# Patient Record
Sex: MALE | Race: WHITE | NOT HISPANIC OR LATINO | URBAN - METROPOLITAN AREA
[De-identification: names, ages, dates, MRNs, and addresses within clinical notes are randomized per-mention and may not be internally consistent; named-entity substitution may affect disease eponyms.]

---

## 2017-05-24 ENCOUNTER — HOSPITAL ENCOUNTER (OUTPATIENT)
Dept: LAB | Facility: MEDICAL CENTER | Age: 77
End: 2017-05-24
Attending: PHYSICIAN ASSISTANT
Payer: MEDICARE

## 2017-05-24 LAB
ALBUMIN SERPL BCP-MCNC: 3.8 G/DL (ref 3.2–4.9)
ALBUMIN/GLOB SERPL: 1.1 G/DL
ALP SERPL-CCNC: 151 U/L (ref 30–99)
ALT SERPL-CCNC: 14 U/L (ref 2–50)
ANION GAP SERPL CALC-SCNC: 7 MMOL/L (ref 0–11.9)
AST SERPL-CCNC: 16 U/L (ref 12–45)
BASOPHILS # BLD AUTO: 0.6 % (ref 0–1.8)
BASOPHILS # BLD: 0.04 K/UL (ref 0–0.12)
BILIRUB SERPL-MCNC: 0.5 MG/DL (ref 0.1–1.5)
BUN SERPL-MCNC: 20 MG/DL (ref 8–22)
CALCIUM SERPL-MCNC: 9.3 MG/DL (ref 8.5–10.5)
CHLORIDE SERPL-SCNC: 106 MMOL/L (ref 96–112)
CO2 SERPL-SCNC: 29 MMOL/L (ref 20–33)
CREAT SERPL-MCNC: 1.09 MG/DL (ref 0.5–1.4)
EOSINOPHIL # BLD AUTO: 0.13 K/UL (ref 0–0.51)
EOSINOPHIL NFR BLD: 1.8 % (ref 0–6.9)
ERYTHROCYTE [DISTWIDTH] IN BLOOD BY AUTOMATED COUNT: 50.5 FL (ref 35.9–50)
GFR SERPL CREATININE-BSD FRML MDRD: >60 ML/MIN/1.73 M 2
GLOBULIN SER CALC-MCNC: 3.6 G/DL (ref 1.9–3.5)
GLUCOSE SERPL-MCNC: 80 MG/DL (ref 65–99)
HCT VFR BLD AUTO: 42.8 % (ref 42–52)
HGB BLD-MCNC: 13.8 G/DL (ref 14–18)
IMM GRANULOCYTES # BLD AUTO: 0.01 K/UL (ref 0–0.11)
IMM GRANULOCYTES NFR BLD AUTO: 0.1 % (ref 0–0.9)
LYMPHOCYTES # BLD AUTO: 1.44 K/UL (ref 1–4.8)
LYMPHOCYTES NFR BLD: 20.2 % (ref 22–41)
MCH RBC QN AUTO: 31.7 PG (ref 27–33)
MCHC RBC AUTO-ENTMCNC: 32.2 G/DL (ref 33.7–35.3)
MCV RBC AUTO: 98.2 FL (ref 81.4–97.8)
MONOCYTES # BLD AUTO: 0.58 K/UL (ref 0–0.85)
MONOCYTES NFR BLD AUTO: 8.1 % (ref 0–13.4)
NEUTROPHILS # BLD AUTO: 4.93 K/UL (ref 1.82–7.42)
NEUTROPHILS NFR BLD: 69.2 % (ref 44–72)
NRBC # BLD AUTO: 0 K/UL
NRBC BLD AUTO-RTO: 0 /100 WBC
PLATELET # BLD AUTO: 310 K/UL (ref 164–446)
PMV BLD AUTO: 10.6 FL (ref 9–12.9)
POTASSIUM SERPL-SCNC: 4.1 MMOL/L (ref 3.6–5.5)
PROT SERPL-MCNC: 7.4 G/DL (ref 6–8.2)
RBC # BLD AUTO: 4.36 M/UL (ref 4.7–6.1)
SODIUM SERPL-SCNC: 142 MMOL/L (ref 135–145)
WBC # BLD AUTO: 7.1 K/UL (ref 4.8–10.8)

## 2017-05-24 PROCEDURE — 80053 COMPREHEN METABOLIC PANEL: CPT

## 2017-05-24 PROCEDURE — 36415 COLL VENOUS BLD VENIPUNCTURE: CPT

## 2017-05-24 PROCEDURE — 80061 LIPID PANEL: CPT

## 2017-05-24 PROCEDURE — 85025 COMPLETE CBC W/AUTO DIFF WBC: CPT

## 2017-05-25 LAB
CHOLEST SERPL-MCNC: NORMAL MG/DL (ref 100–199)
HDLC SERPL-MCNC: NORMAL MG/DL
LDLC SERPL CALC-MCNC: NORMAL MG/DL
TRIGL SERPL-MCNC: NORMAL MG/DL (ref 0–149)

## 2017-06-19 ENCOUNTER — OFFICE VISIT (OUTPATIENT)
Dept: URGENT CARE | Facility: PHYSICIAN GROUP | Age: 77
End: 2017-06-19
Payer: MEDICARE

## 2017-06-19 VITALS
WEIGHT: 143 LBS | OXYGEN SATURATION: 97 % | HEART RATE: 61 BPM | SYSTOLIC BLOOD PRESSURE: 126 MMHG | HEIGHT: 73 IN | BODY MASS INDEX: 18.95 KG/M2 | TEMPERATURE: 98.1 F | DIASTOLIC BLOOD PRESSURE: 80 MMHG

## 2017-06-19 DIAGNOSIS — S51.801A: ICD-10-CM

## 2017-06-19 PROCEDURE — 99203 OFFICE O/P NEW LOW 30 MIN: CPT | Performed by: FAMILY MEDICINE

## 2017-06-19 NOTE — PROGRESS NOTES
Chief Complaint:    Chief Complaint   Patient presents with   • Arm Injury     right arm at 8:30am        History of Present Illness:    This is a new problem. He sustained wounds to right forearm today, 7 hours prior to presentation. Fell in kitchen. He put gauze to wounds. Reports last tetanus shot was less than 5 years ago.      Review of Systems:    Constitutional: Negative for fever, chills, and diaphoresis.   Eyes: Negative for change in vision, photophobia, pain, redness, and discharge.  ENT: Negative for ear pain, ear discharge, hearing loss, tinnitus, nasal congestion, nosebleeds, and sore throat.    Respiratory: Negative for cough, hemoptysis, sputum production, shortness of breath, wheezing, and stridor.    Cardiovascular: Negative for chest pain, palpitations, orthopnea, claudication, leg swelling, and PND.   Gastrointestinal: Negative for abdominal pain, nausea, vomiting, diarrhea, constipation, blood in stool, and melena.   Genitourinary: Negative for dysuria, urinary urgency, urinary frequency, hematuria, and flank pain.   Musculoskeletal: See HPI.  Skin: See HPI.   Neurological: Negative for dizziness, tingling, tremors, sensory change, speech change, focal weakness, seizures, loss of consciousness, and headaches.       Past Medical History:    History reviewed. No pertinent past medical history.    Past Surgical History:    History reviewed. No pertinent past surgical history.    Social History:    Social History     Social History   • Marital Status: Single     Spouse Name: N/A   • Number of Children: N/A   • Years of Education: N/A     Occupational History   • Not on file.     Social History Main Topics   • Smoking status: Not on file   • Smokeless tobacco: Not on file   • Alcohol Use: Not on file   • Drug Use: Not on file   • Sexual Activity: Not on file     Other Topics Concern   • Not on file     Social History Narrative   • No narrative on file       Family History:    History reviewed. No  "pertinent family history.    Medications:    Tramadol 50 mg    Allergies:    No Known Allergies      Vitals:    Filed Vitals:    06/19/17 1537   BP: 126/80   Pulse: 61   Temp: 36.7 °C (98.1 °F)   Height: 1.854 m (6' 1\")   Weight: 64.864 kg (143 lb)   SpO2: 97%       Physical Exam:    Constitutional: Vital signs reviewed. Appears well-developed and well-nourished. No acute distress.   Eyes: Sclera white, conjunctivae clear.  ENT: External ears normal. Hearing normal.  Cardiovascular: Peripheral pulses 2+.  Pulmonary/Chest: Respirations non-labored.  Musculoskeletal: Normal range of motion. No muscular atrophy or weakness.  Neurological: Alert and oriented to person, place, and time. Muscle tone normal. Coordination normal. Light touch and sensation normal.   Skin: Gauze was stuck to wounds. This was removed after moistening with saline. Right forearm: large superficial wound with large skin tear. Another smaller wound with skin tear distal to large wound. No uncontrolled bleeding.  Psychiatric: Normal mood and affect. Behavior is normal. Judgment and thought content normal.       Assessment / Plan:    1. Wound, open, forearm, right, initial encounter  - mupirocin (BACTROBAN) 2 % Ointment; APPLY TO ARM WOUND 1 TO 3 TIMES A DAY UNTIL  WOUND IS HEALED.  Dispense: 22 g; Refill: 3      Discussed with him DDX and management options.    Wounds cleansed with saline.    I cut off large portion of torn skin that will not be viable from larger wound.    Antibiotic ointment and non-stick dressing applied, secured with coban.    He says he can get help to change this tomorrow and use antibiotic ointment prescribed.    He will return on 6/21/17 for wound check or sooner if any problems.    "

## 2017-06-19 NOTE — MR AVS SNAPSHOT
"Akshat Page   2017 3:20 PM   Office Visit   MRN: [id number]    Department:  [facility]   Dept Phone:  [phone]    Description:  Male : 1940   Provider:  Ross Frank M.D.           Reason for Visit     Arm Injury Rt forearm injury occured this morning at approx 830am, pt states he fell and scrapped his forearm      Allergies as of 2017     No Known Allergies      You were diagnosed with     Wound, open, forearm, right, initial encounter   [[id number]]         Vital Signs     Blood Pressure Pulse Temperature Height Weight Body Mass Index    126/80 mmHg 61 36.7 °C (98.1 °F) 1.854 m (6' 1\") 64.864 kg (143 lb) 18.87 kg/m2    Oxygen Saturation                   97%           Basic Information     Date Of Birth Sex Race Ethnicity Preferred Language    1940 Male White Non- English      Health Maintenance     Patient has no pending health maintenance at this time      Current Immunizations     No immunizations on file.      Below and/or attached are the medications your provider expects you to take. Review all of your home medications and newly ordered medications with your provider and/or pharmacist. Follow medication instructions as directed by your provider and/or pharmacist. Please keep your medication list with you and share with your provider. Update the information when medications are discontinued, doses are changed, or new medications (including over-the-counter products) are added; and carry medication information at all times in the event of emergency situations     Allergies:  No Known Allergies          Medications  Valid as of: 2017 -  4:25 PM    Generic Name Brand Name Tablet Size Instructions for use    Mupirocin (Ointment) BACTROBAN 2 % APPLY TO ARM WOUND 1 TO 3 TIMES A DAY UNTIL  WOUND IS HEALED.        .                 Medicines prescribed today were sent to:     None      Medication refill instructions:       If your prescription bottle " indicates you have medication refills left, it is not necessary to call your provider’s office. Please contact your pharmacy and they will refill your medication.    If your prescription bottle indicates you do not have any refills left, you may request refills at any time through one of the following ways: The online [employer] system (except Urgent Care), by calling your provider’s office, or by asking your pharmacy to contact your provider’s office with a refill request. Medication refills are processed only during regular business hours and may not be available until the next business day. Your provider may request additional information or to have a follow-up visit with you prior to refilling your medication.   *Please Note: Medication refills are assigned a new Rx number when refilled electronically. Your pharmacy may indicate that no refills were authorized even though a new prescription for the same medication is available at the pharmacy. Please request the medicine by name with the pharmacy before contacting your provider for a refill.           [employer] Access Code: [email or web address]  Expires: 7/19/2017  4:25 PM    [employer]  A secure, online tool to manage your health information     [employer]’s [employer]® is a secure, online tool that connects you to your personalized health information from the privacy of your home -- day or night - making it very easy for you to manage your healthcare. Once the activation process is completed, you can even access your medical information using the [employer] marty, which is available for free in the Apple Marty store or Google Play store.     [employer] provides the following levels of access (as shown below):   My Chart Features   [facility]  Specialists [facility]  Urgent  Care Non-[facility]  Primary Care  Doctor   Email your healthcare team securely and privately 24/7 X X X    Manage appointments: schedule your next appointment; view details of past/upcoming  appointments X      Request prescription refills. X      View recent personal medical records, including lab and immunizations X X X X   View health record, including health history, allergies, medications X X X X   Read reports about your outpatient visits, procedures, consult and ER notes X X X X   See your discharge summary, which is a recap of your hospital and/or ER visit that includes your diagnosis, lab results, and care plan. X X       How to register for [employer]:  1. Go to  [email or web address].  2. Click on the Sign Up Now box, which takes you to the New Member Sign Up page. You will need to provide the following information:  a. Enter your [employer] Access Code exactly as it appears at the top of this page. (You will not need to use this code after you’ve completed the sign-up process. If you do not sign up before the expiration date, you must request a new code.)   b. Enter your date of birth.   c. Enter your home email address.   d. Click Submit, and follow the next screen’s instructions.  3. Create a [employer] ID. This will be your [employer] login ID and cannot be changed, so think of one that is secure and easy to remember.  4. Create a [employer] password. You can change your password at any time.  5. Enter your Password Reset Question and Answer. This can be used at a later time if you forget your password.   6. Enter your e-mail address. This allows you to receive e-mail notifications when new information is available in [employer].  7. Click Sign Up. You can now view your health information.    For assistance activating your [employer] account, call [phone]

## 2017-08-29 ENCOUNTER — APPOINTMENT (OUTPATIENT)
Dept: RADIOLOGY | Facility: MEDICAL CENTER | Age: 77
End: 2017-08-29
Attending: EMERGENCY MEDICINE
Payer: MEDICARE

## 2017-08-29 ENCOUNTER — HOSPITAL ENCOUNTER (EMERGENCY)
Facility: MEDICAL CENTER | Age: 77
End: 2017-08-29
Attending: EMERGENCY MEDICINE
Payer: MEDICARE

## 2017-08-29 VITALS
TEMPERATURE: 99.1 F | HEIGHT: 71 IN | DIASTOLIC BLOOD PRESSURE: 82 MMHG | RESPIRATION RATE: 16 BRPM | SYSTOLIC BLOOD PRESSURE: 133 MMHG | OXYGEN SATURATION: 96 % | HEART RATE: 76 BPM

## 2017-08-29 DIAGNOSIS — S50.02XA CONTUSION OF LEFT ELBOW, INITIAL ENCOUNTER: ICD-10-CM

## 2017-08-29 DIAGNOSIS — R47.1 DYSARTHRIA: ICD-10-CM

## 2017-08-29 DIAGNOSIS — S09.90XA CHI (CLOSED HEAD INJURY), INITIAL ENCOUNTER: ICD-10-CM

## 2017-08-29 PROCEDURE — 73080 X-RAY EXAM OF ELBOW: CPT | Mod: LT

## 2017-08-29 PROCEDURE — 70450 CT HEAD/BRAIN W/O DYE: CPT

## 2017-08-29 PROCEDURE — 71010 DX-CHEST-PORTABLE (1 VIEW): CPT

## 2017-08-29 PROCEDURE — 99284 EMERGENCY DEPT VISIT MOD MDM: CPT

## 2017-08-29 ASSESSMENT — PAIN SCALES - GENERAL: PAINLEVEL_OUTOF10: 0

## 2017-08-29 ASSESSMENT — LIFESTYLE VARIABLES: DO YOU DRINK ALCOHOL: NO

## 2017-08-29 NOTE — ED NOTES
Pt was reportedly given prescribed tramadol for head pain and then had 1 episode of emesis. Report given to Raegan BRADY.

## 2017-08-29 NOTE — ED NOTES
BIB [facility] from [place] for a GLF resulting in a hematoma to the L forehead with ecchymosis, arrives with steri strips in place. Also there is a skin tear to the L elbow with steri strips in place and dark colored ecchymosis. Pt has hx of stroke last year resulting in what appears to be L sided weakness and aphasia. Pt speech is very difficult to understand, most times is incomprehensible. Pt is alert and awake.

## 2017-08-29 NOTE — ED PROVIDER NOTES
"ED Provider Note    Scribed for Marcos Leavitt M.D. by Zaki Dangelo. 8/29/2017  3:23 PM    Primary care provider: MEL Sanchez  Means of arrival: Ambulance  History obtained from: Patient  History limited by: Poor Historian.     CHIEF COMPLAINT  Chief Complaint   Patient presents with   • GLF       HPI  Akshat Zarate is a 76 y.o. male who presents to the Emergency Department by ambulance from [place] for evaluation of ground level fall. Patient is complaining of head pain. There is a hematoma to left side of forehead and skin tear to left elbow. Per report, patient experienced 1 episode of emesis following Tramadol administration. Denies abdominal pain. No lower extremity injuries noted.     Further history of illness limited secondary to poor historian.     REVIEW OF SYSTEMS  Pertinent positives include GLF, head injury, head pain, elbow injury, and vomiting. Pertinent negatives include no abdominal pain or lower extremity injuries.     Further review of systems limited secondary to prasanth historian.     PAST MEDICAL HISTORY   has a past medical history of Hypercholesteremia; Hypertension; and Stroke (CMS-HCC).    SURGICAL HISTORY   has a past surgical history that includes other cardiac surgery.    SOCIAL HISTORY  Currently resides at [facility]     FAMILY HISTORY  History reviewed. No pertinent family history.    CURRENT MEDICATIONS  No current facility-administered medications on file prior to encounter.      Current Outpatient Prescriptions on File Prior to Encounter   Medication Sig Dispense Refill   • mupirocin (BACTROBAN) 2 % Ointment APPLY TO ARM WOUND 1 TO 3 TIMES A DAY UNTIL  WOUND IS HEALED. 22 g 3       ALLERGIES  No Known Allergies    PHYSICAL EXAM  VITAL SIGNS: /72   Pulse 65   Temp 37.3 °C (99.1 °F)   Resp 14   Ht 1.803 m (5' 11\")     Constitutional: Well developed, Well nourished, Mild distress, Non-toxic appearance.   HENT: Normocephalic, Hematoma with steri strips to left " side of forehead, Bilateral external ears normal, Oropharynx moist, No oral exudates.   Eyes: PERRLA, EOMI, Conjunctiva normal, No discharge.   Neck: No tenderness, Supple, No stridor.   Lymphatic: No lymphadenopathy noted.   Cardiovascular: Normal heart rate, Normal rhythm.   Thorax & Lungs: Clear to auscultation bilaterally, No respiratory distress, No wheezing, No crackles.   Abdomen: Soft, No tenderness, No masses, No pulsatile masses.   Skin: Warm, Dry  Extremities:, Hematoma and steri strips to left elbow   Musculoskeletal: No major deformities noted. Intact distal pulses  Neurologic: Awake, alert. Moves all extremities spontaneously.  Psychiatric: Affect normal, Judgment normal, Mood normal.     RADIOLOGY  CT-HEAD W/O   Final Result      1.  No acute intracranial findings.      2.  Diffuse atrophy and periventricular white matter changes are consistent with chronic small vessel disease.         DX-ELBOW-COMPLETE 3+ LEFT   Final Result         Mild soft tissue swelling about the olecranon. No definite fracture but evaluation limited due to osseous demineralization.      DX-CHEST-PORTABLE (1 VIEW)   Final Result         1. No acute cardiopulmonary abnormalities are identified.      2. Sclerotic expansile appearance of the left acromion, coracoid process and glenoid. The differential includes Paget's disease versus metastasis. Correlate clinically.        The radiologist's interpretation of all radiological studies have been reviewed by me.    COURSE & MEDICAL DECISION MAKING  Pertinent Labs & Imaging studies reviewed. (See chart for details)    I reviewed the patient's medical records which showedHistory of previous stroke    3:23 PM - Patient seen and examined at bedside. Ordered head CT, chest x-ray, left elbow x-ray to evaluate his symptoms.   Decision Making:  Patient status post fall head injury, left elbow pain, x-rays were unremarkable, we'll discharge the patient back to his skilled nursing facility,  have the patient return with any other concerns.     The patient will return for new or worsening symptoms and is stable at the time of discharge.    The patient is referred to a primary physician for blood pressure management, diabetic screening, and for all other preventative health concerns.    DISPOSITION:  Patient will be discharged home in stable condition.    FOLLOW UP:  [facility], Emergency Dept  [address] [id number] [phone]    If symptoms worsen      OUTPATIENT MEDICATIONS:  New Prescriptions    No medications on file     FINAL IMPRESSION  1. CHI (closed head injury), initial encounter    2. Contusion of left elbow, initial encounter    3. Dysarthria          Zaki MOSLEY (Scribe), am scribing for, and in the presence of, Marcos Leavitt M.D..    Electronically signed by: Zaki Dangelo (Juanitaibe), 8/29/2017    Marcos MOSLEY M.D. personally performed the services described in this documentation, as scribed by Zaki Dangelo in my presence, and it is both accurate and complete.    The note accurately reflects work and decisions made by me.  Marcos Leavitt  8/29/2017  6:18 PM

## 2017-08-30 NOTE — DISCHARGE PLANNING
RN reported that the pt is cleared to d/c back to [facility] and is fully bed bound. MIKAYLA called [facility] and set up transport through [facility] for [facility]. MIKAYLA updated bedside RN.

## 2017-08-30 NOTE — DISCHARGE INSTRUCTIONS
Please follow-up with your primary care provider for blood pressure management.        Head Injury, Adult  You have a head injury. Headaches and throwing up (vomiting) are common after a head injury. It should be easy to wake up from sleeping. Sometimes you must stay in the hospital. Most problems happen within the first 24 hours. Side effects may occur up to 7-10 days after the injury.   WHAT ARE THE TYPES OF HEAD INJURIES?  Head injuries can be as minor as a bump. Some head injuries can be more severe. More severe head injuries include:  · A jarring injury to the brain (concussion).  · A bruise of the brain (contusion). This mean there is bleeding in the brain that can cause swelling.  · A cracked skull (skull fracture).  · Bleeding in the brain that collects, clots, and forms a bump (hematoma).  WHEN SHOULD I GET HELP RIGHT AWAY?   · You are confused or sleepy.  · You cannot be woken up.  · You feel sick to your stomach (nauseous) or keep throwing up (vomiting).  · Your dizziness or unsteadiness is getting worse.  · You have very bad, lasting headaches that are not helped by medicine. Take medicines only as told by your doctor.  · You cannot use your arms or legs like normal.  · You cannot walk.  · You notice changes in the black spots in the center of the colored part of your eye (pupil).  · You have clear or bloody fluid coming from your nose or ears.  · You have trouble seeing.  During the next 24 hours after the injury, you must stay with someone who can watch you. This person should get help right away (call 911 in the [place] if you start to shake and are not able to control it (have seizures), you pass out, or you are unable to wake up.  HOW CAN I PREVENT A HEAD INJURY IN THE FUTURE?  · Wear seat belts.  · Wear a helmet while bike riding and playing sports like football.  · Stay away from dangerous activities around the house.  WHEN CAN I RETURN TO NORMAL ACTIVITIES AND ATHLETICS?  See your doctor before  doing these activities. You should not do normal activities or play contact sports until 1 week after the following symptoms have stopped:  · Headache that does not go away.  · Dizziness.  · Poor attention.  · Confusion.  · Memory problems.  · Sickness to your stomach or throwing up.  · Tiredness.  · Fussiness.  · Bothered by bright lights or loud noises.  · Anxiousness or depression.  · Restless sleep.  MAKE SURE YOU:   · Understand these instructions.  · Will watch your condition.  · Will get help right away if you are not doing well or get worse.     This information is not intended to replace advice given to you by your health care provider. Make sure you discuss any questions you have with your health care provider.     Document Released: 11/30/2009 Document Revised: 01/08/2016 Document Reviewed: 08/25/2014  [employer] Interactive Patient Education ©2016 [employer]

## 2017-08-30 NOTE — DISCHARGE PLANNING
Medical Social Work    MSW was notified by ER Charge RN Gume that per [facility] they are currently at [place] with pt and pt is not a resident there.  MSW reviewed pt's chart and all nursing, MD and SW notes state that pt's from [place].  [facility] states that pt has paperwork from [facility].  MSW contacted Leatha with [facility] who verified pt is a resident there and accepted pt back.  Leatha states that pt's MD is Dr. Glover.  MSW updated [facility] supervisor.  ER Charge RN was also updated.    Plan: Pt to return to [facility] via [facility].

## 2017-08-30 NOTE — ED NOTES
Pt discharged via [facility] to [place]. Discharge instructions reviewed with patient, all questions answered. Vital signs stable.

## 2021-01-14 DIAGNOSIS — Z23 NEED FOR VACCINATION: ICD-10-CM
